# Patient Record
Sex: FEMALE | RURAL
[De-identification: names, ages, dates, MRNs, and addresses within clinical notes are randomized per-mention and may not be internally consistent; named-entity substitution may affect disease eponyms.]

---

## 2023-10-03 PROCEDURE — 88342 IMHCHEM/IMCYTCHM 1ST ANTB: CPT | Mod: 26,,, | Performed by: PATHOLOGY

## 2023-10-03 PROCEDURE — 88305 TISSUE EXAM BY PATHOLOGIST: CPT | Mod: TC,59,SUR

## 2023-10-03 PROCEDURE — 88305 TISSUE EXAM BY PATHOLOGIST: CPT | Mod: 26,,, | Performed by: PATHOLOGY

## 2023-10-03 PROCEDURE — 88342 PATHOLOGY, DERMATOLOGY: ICD-10-PCS | Mod: 26,,, | Performed by: PATHOLOGY

## 2023-10-03 PROCEDURE — 88341 PATHOLOGY, DERMATOLOGY: ICD-10-PCS | Mod: 26,,, | Performed by: PATHOLOGY

## 2023-10-03 PROCEDURE — 88305 PATHOLOGY, DERMATOLOGY: ICD-10-PCS | Mod: 26,,, | Performed by: PATHOLOGY

## 2023-10-03 PROCEDURE — 88341 IMHCHEM/IMCYTCHM EA ADD ANTB: CPT | Mod: 26,,, | Performed by: PATHOLOGY

## 2023-10-04 ENCOUNTER — LAB REQUISITION (OUTPATIENT)
Dept: LAB | Facility: HOSPITAL | Age: 68
End: 2023-10-04
Payer: MEDICARE

## 2023-10-04 DIAGNOSIS — D49.2 NEOPLASM OF UNSPECIFIED BEHAVIOR OF BONE, SOFT TISSUE, AND SKIN: ICD-10-CM

## 2023-10-05 LAB
ESTROGEN SERPL-MCNC: NORMAL PG/ML
INSULIN SERPL-ACNC: NORMAL U[IU]/ML
LAB AP GROSS DESCRIPTION: NORMAL
LAB AP LABORATORY NOTES: NORMAL
LAB AP SPEC A DDX: NORMAL
LAB AP SPEC A MORPHOLOGY: NORMAL
LAB AP SPEC A PROCEDURE: NORMAL
LAB AP SPEC B DDX: NORMAL
LAB AP SPEC B MORPHOLOGY: NORMAL
LAB AP SPEC B PROCEDURE: NORMAL
T3RU NFR SERPL: NORMAL %

## 2023-10-17 PROCEDURE — 88305 TISSUE EXAM BY PATHOLOGIST: CPT | Mod: 26,,, | Performed by: PATHOLOGY

## 2023-10-17 PROCEDURE — 88305 PATHOLOGY, DERMATOLOGY: ICD-10-PCS | Mod: 26,,, | Performed by: PATHOLOGY

## 2023-10-17 PROCEDURE — 88305 TISSUE EXAM BY PATHOLOGIST: CPT | Mod: TC,SUR | Performed by: DERMATOLOGY

## 2023-10-18 ENCOUNTER — LAB REQUISITION (OUTPATIENT)
Dept: LAB | Facility: HOSPITAL | Age: 68
End: 2023-10-18
Attending: DERMATOLOGY
Payer: MEDICARE

## 2023-10-18 DIAGNOSIS — D04.5 CARCINOMA IN SITU OF SKIN OF TRUNK: ICD-10-CM

## 2023-10-19 LAB
DHEA SERPL-MCNC: NORMAL
ESTROGEN SERPL-MCNC: NORMAL PG/ML
INSULIN SERPL-ACNC: NORMAL U[IU]/ML
LAB AP GROSS DESCRIPTION: NORMAL
LAB AP LABORATORY NOTES: NORMAL
LAB AP SPEC A DDX: NORMAL
LAB AP SPEC A MORPHOLOGY: NORMAL
LAB AP SPEC A PROCEDURE: NORMAL
T3RU NFR SERPL: NORMAL %

## 2023-10-23 PROCEDURE — 88305 TISSUE EXAM BY PATHOLOGIST: CPT | Mod: 26,,, | Performed by: PATHOLOGY

## 2023-10-23 PROCEDURE — 88305 TISSUE EXAM BY PATHOLOGIST: CPT | Mod: TC,SUR | Performed by: DERMATOLOGY

## 2023-10-23 PROCEDURE — 88305 PATHOLOGY, DERMATOLOGY: ICD-10-PCS | Mod: 26,,, | Performed by: PATHOLOGY

## 2023-10-24 ENCOUNTER — LAB REQUISITION (OUTPATIENT)
Dept: LAB | Facility: HOSPITAL | Age: 68
End: 2023-10-24
Attending: DERMATOLOGY
Payer: MEDICARE

## 2023-10-24 DIAGNOSIS — D22.5 MELANOCYTIC NEVI OF TRUNK: ICD-10-CM

## 2023-10-24 DIAGNOSIS — R20.8 OTHER DISTURBANCES OF SKIN SENSATION: ICD-10-CM

## 2023-10-24 DIAGNOSIS — L53.8 OTHER SPECIFIED ERYTHEMATOUS CONDITIONS: ICD-10-CM

## 2023-10-25 LAB
ESTROGEN SERPL-MCNC: NORMAL PG/ML
INSULIN SERPL-ACNC: NORMAL U[IU]/ML
LAB AP GROSS DESCRIPTION: NORMAL
LAB AP LABORATORY NOTES: NORMAL
LAB AP SPEC A DDX: NORMAL
LAB AP SPEC A MORPHOLOGY: NORMAL
LAB AP SPEC A PROCEDURE: NORMAL
T3RU NFR SERPL: NORMAL %

## 2025-03-20 PROCEDURE — 88305 TISSUE EXAM BY PATHOLOGIST: CPT | Mod: 26,,, | Performed by: PATHOLOGY

## 2025-03-20 PROCEDURE — 88341 IMHCHEM/IMCYTCHM EA ADD ANTB: CPT | Mod: 26,,, | Performed by: PATHOLOGY

## 2025-03-20 PROCEDURE — 88305 TISSUE EXAM BY PATHOLOGIST: CPT | Mod: TC,SUR | Performed by: DERMATOLOGY

## 2025-03-20 PROCEDURE — 88342 IMHCHEM/IMCYTCHM 1ST ANTB: CPT | Mod: 26,,, | Performed by: PATHOLOGY

## 2025-03-21 ENCOUNTER — LAB REQUISITION (OUTPATIENT)
Dept: LAB | Facility: HOSPITAL | Age: 70
End: 2025-03-21
Attending: DERMATOLOGY
Payer: MEDICARE

## 2025-03-21 DIAGNOSIS — D49.2 NEOPLASM OF UNSPECIFIED BEHAVIOR OF BONE, SOFT TISSUE, AND SKIN: ICD-10-CM

## 2025-03-25 LAB
DHEA SERPL-MCNC: NORMAL
ESTROGEN SERPL-MCNC: NORMAL PG/ML
INSULIN SERPL-ACNC: NORMAL U[IU]/ML
LAB AP GROSS DESCRIPTION: NORMAL
LAB AP LABORATORY NOTES: NORMAL
LAB AP SPEC A DDX: NORMAL
LAB AP SPEC A MORPHOLOGY: NORMAL
LAB AP SPEC B DDX: NORMAL
LAB AP SPEC B MORPHOLOGY: NORMAL
LAB AP SPEC B PROCEDURE: NORMAL
T3RU NFR SERPL: NORMAL %

## 2025-06-06 DIAGNOSIS — M54.2 NECK PAIN: Primary | ICD-10-CM

## 2025-06-06 DIAGNOSIS — M25.511 RIGHT SHOULDER PAIN: ICD-10-CM

## 2025-06-23 ENCOUNTER — CLINICAL SUPPORT (OUTPATIENT)
Dept: REHABILITATION | Facility: HOSPITAL | Age: 70
End: 2025-06-23
Payer: MEDICARE

## 2025-06-23 DIAGNOSIS — R53.1 WEAKNESS: ICD-10-CM

## 2025-06-23 DIAGNOSIS — M25.511 CHRONIC RIGHT SHOULDER PAIN: Primary | ICD-10-CM

## 2025-06-23 DIAGNOSIS — G89.29 CHRONIC RIGHT SHOULDER PAIN: Primary | ICD-10-CM

## 2025-06-23 DIAGNOSIS — M54.2 CERVICALGIA: ICD-10-CM

## 2025-06-23 PROCEDURE — 97140 MANUAL THERAPY 1/> REGIONS: CPT

## 2025-06-23 PROCEDURE — 97161 PT EVAL LOW COMPLEX 20 MIN: CPT

## 2025-06-23 NOTE — PATIENT INSTRUCTIONS
Access Code: [email or web address]  URL: [email or web address]  Date: 06/23/2025  Prepared by: Jacky Marie    Exercises  - Corner Pec Major Stretch  - 3 reps - 30 sec hold  - Seated Scapular Retraction  - 3 sets - 10 reps  - Supine Chin Tuck  - 3 sets - 10 reps

## 2025-06-23 NOTE — PROGRESS NOTES
"  Outpatient Rehab    Physical Therapy Evaluation    Patient Name: Chen Millan  MRN: [id number]  YOB: 1955  Encounter Date: 6/23/2025    Therapy Diagnosis:   Encounter Diagnoses   Name Primary?    Chronic right shoulder pain Yes    Cervicalgia     Weakness      Physician: Haylie Rivas DO    Physician Orders: Eval and Treat  Medical Diagnosis: Right shoulder pain  Surgical Diagnosis: Not applicable for this Episode   Surgical Date: Not applicable for this Episode  Days Since Last Surgery: Not applicable for this Episode    Visit # / Visits Authorized:  1 / 1  Insurance Authorization Period: 6/6/2025 to 6/6/2026  Date of Evaluation: 6/23/2025  Plan of Care Certification: 6/23/2025 to 07/18/2025     Time In: 1405   Time Out: 1450  Total Time (in minutes): 45   Total Billable Time (in minutes): 45    Intake Outcome Measure for FOTO Survey    Therapist reviewed FOTO scores for Chen Millan on 6/23/2025.   FOTO report - see Media section or FOTO account episode details.     Intake Score: 56%    Precautions:       Subjective   History of Present Illness  Chen is a 69 y.o. female who reports to physical therapy with a chief concern of headaches.         Diagnostic tests related to this condition: X-ray.   X-Ray Details: FINDINGS:There is straightening of the normal lordotic curve of the cervical spine.  The vertebral body heights are well maintained.  There is moderately prominent degenerative disc disease at C5-C6 and C6-C7 with moderate facet joint arthropathy posteriorly throughout the mid cervical spine.    History of Present Condition/Illness: Pt reports that several months ago she was stepping down off a ladder and did not know she was still as high as she was and stepped down from higher rung than she thought she was on, "jamming" her knee. She says that she was unable to bear a lot of weight on the R knee for 3 days as a result of the ladder incident. She says that her knee is better now but " that what is still present is what feels like a muscle knot on the R side of her upper back near her R shoulder blade. She says that she will have pain that radiates upward toward head that will cause headaches, worse on the R side compared to the L side. She says that she did try a chiropractor but that what they did with her did not really address her main concern of headaches. Pt denies numbness/tingling into the UE's. She denies headaches/neck/shoulder pain prior to this incident. Pt reports that she underwent/finished breast cancer treatment in 2022 with most recent scan in September 2024 with reports that everything was clear at that time.     Pain     Patient reports a current pain level of 3/10. Pain at best is reported as 2/10. Pain at worst is reported as 7/10.   Location: head/neck (headaches)  Clinical Progression (since onset): Stable  Pain Qualities: Aching, Radiating, Other (Comment)  Other Pain Qualities: pressure  Pain-Relieving Factors: Other (Comment), Heat, Ice  Other Pain-Relieving Factors: unable to report if anything makes the headache better  Pain-Aggravating Factors: Other (Comment)  Other Pain-Aggravating Factors: unable to report if anything makes the headache worse           Past Medical History/Physical Systems Review:   Chen Millan  has no past medical history on file.    Chen Millan  has no past surgical history on file.    Chen currently has no medications in their medication list.    Review of patient's allergies indicates:  Not on File     Objective   Posture  Patient presents with a Forward head position.     Shoulders are Elevated and Rounded.             Spinal Mobility  Cervical Mobility Details: Generally hypomobile throughout C-spine with CPA's and bilateral side glides     Spinal Muscle Palpation     Ttp noted at about T5-T6 area on the R side and in supraspinatus on the R side.                Subcranial Range of Motion   Active Restricted? Passive Restricted? Pain    Flexion         Protraction         Retraction           Cervical Range of Motion   Active (deg) Passive (deg) Pain   Flexion 56 (no change in pain, just some pulling across CT junction)       Extension 44 (no change in pain, just some discomfort near CT junction)       Right Lateral Flexion 22 (no change in headache symptoms)       Right Rotation 58 (no change in headache symptoms)       Left Lateral Flexion 20 (no change in headache symptoms)       Left Rotation 55 (no change in headache symptoms)         Overpressure does not change pt's headache symptoms.               Cervical Screen  Tests  Positive: Right Distraction and Left Distraction  Negative: Right Spurling's B and Left Spurling's B  Cervical Range of Motion           Thoracic Range of Motion             Cervical/Thoracic Special Tests            Cervical Tests  Positive: Right Distraction and Left Distraction  Negative: Right Cervical Compression and Left Cervical Compression  Negative: Right Spurling's B and Left Spurling's B  Distraction (+) for slight relief of symptoms.               Treatment:  Therapeutic Exercise  TE 1: pt education: POC and HEP  TE 2: chin tucks  TE 3: corner stretch  TE 4: scap retract  Manual Therapy  MT 1: manual cervical traction in supine      Time Entry(in minutes):  PT Evaluation (Low) Time Entry: 33  Manual Therapy Time Entry: 8  Therapeutic Exercise Time Entry: 4    Assessment & Plan   Assessment  Chen presents with a condition of Low complexity.   Presentation of Symptoms: Stable  Will Comorbidities Impact Care: No       Functional Limitations: Participating in leisure activities, Pain with ADLs/IADLs  Personal Factors Affecting Prognosis: Pain    Patient Goal for Therapy (PT): decrease severity and frequency of headaches  Prognosis: Good  Assessment Details: Pt with cervicogenic headache. She has decreased C-spine mobility. She will likely benefit from postural re-education and cervical mobility/flexibility and  strengthening.     Plan  From a physical therapy perspective, the patient would benefit from: Skilled Rehab Services    Planned therapy interventions include: Therapeutic exercise, Therapeutic activities, Neuromuscular re-education, and Manual therapy.    Planned modalities to include: Cryotherapy (cold pack) and Thermotherapy (hot pack).        Visit Frequency: 2 times Per Week for 4 Weeks.       This plan was discussed with Patient.   Discussion participants: Agreed Upon Plan of Care  Plan details: Pt reports finishing breast cancer treatments in 2022, so will likely need to hold on modalities unless cleared by oncologist.           The patient's spiritual, cultural, and educational needs were considered, and the patient is agreeable to the plan of care and goals.           Goals:   Active       Long term goals       pt will be independent with progressive HEP       Start:  06/23/25    Expected End:  07/18/25            pt will increase FOTO score to greater than or equal to 66 by DC in order to demonstrate an increase in functional ability       Start:  06/23/25    Expected End:  07/18/25            pt will decrease headache severity at worst from 9/10 to less than or equal to 3/10 to improve QOL       Start:  06/23/25    Expected End:  07/18/25            pt will decrease frequency of headache from everyday to only 3 days a week in order to improve QOL       Start:  06/23/25    Expected End:  07/18/25            pt will increase independence with postural awareness as evidenced by requiring no cuing during PT sessions       Start:  06/23/25    Expected End:  07/18/25               Short term goals       pt will decrease headache severity at worst from 9/10 to less than or equal to 6/10 to improve QOL       Start:  06/23/25    Expected End:  07/04/25            pt will decrease frequency of headache from everyday to only 4 days a week in order to improve QOL       Start:  06/23/25    Expected End:  07/04/25             pt will increase independence with postural awareness as evidenced by requiring only minimal cuing during PT sessions       Start:  06/23/25    Expected End:  07/04/25                Jacky Marie PT, DPT  6/23/2025

## 2025-06-23 NOTE — LETTER
June 23, 2025  Haylie Rivas DO  [address] [facility] [place] [address]      To whom it may concern,     The attached plan of care is being sent to you for review and reference.    You may indicate your approval by signing the document electronically, or by faxing/mailing a signed copy of the final page of this document back to the attention of Jacky Marie PT, DPT:         Plan of Care 6/23/25   Effective from: 6/23/2025  Effective to: 7/18/2025    Plan ID: [id number]            Participants as of Finalize on 6/23/2025    Name Type Comments Contact Info    Haylie Rivas DO Referring Provider  [phone]    Jacky Marie PT, DPT Physical Therapist         Last Plan Note     Author: Jacky Marie PT, DPT Status: Signed Last edited: 6/23/2025  2:00 PM         Outpatient Rehab    Physical Therapy Evaluation    Patient Name: Chen Millan  MRN: [id number]  YOB: 1955  Encounter Date: 6/23/2025    Therapy Diagnosis:   Encounter Diagnoses   Name Primary?    Chronic right shoulder pain Yes    Cervicalgia     Weakness      Physician: Haylie Rivas DO    Physician Orders: Eval and Treat  Medical Diagnosis: Right shoulder pain  Surgical Diagnosis: Not applicable for this Episode   Surgical Date: Not applicable for this Episode  Days Since Last Surgery: Not applicable for this Episode    Visit # / Visits Authorized:  1 / 1  Insurance Authorization Period: 6/6/2025 to 6/6/2026  Date of Evaluation: 6/23/2025  Plan of Care Certification: 6/23/2025 to 07/18/2025     Time In: 1405   Time Out: 1450  Total Time (in minutes): 45   Total Billable Time (in minutes): 45    Intake Outcome Measure for FOTO Survey    Therapist reviewed FOTO scores for Chen Millan on 6/23/2025.   FOTO report - see Media section or FOTO account episode details.     Intake Score: 56%    Precautions:       Subjective   History of Present Illness  Chen is a 69 y.o. female who reports to physical  "therapy with a chief concern of headaches.         Diagnostic tests related to this condition: X-ray.   X-Ray Details: FINDINGS:There is straightening of the normal lordotic curve of the cervical spine.  The vertebral body heights are well maintained.  There is moderately prominent degenerative disc disease at C5-C6 and C6-C7 with moderate facet joint arthropathy posteriorly throughout the mid cervical spine.    History of Present Condition/Illness: Pt reports that several months ago she was stepping down off a ladder and did not know she was still as high as she was and stepped down from higher rung than she thought she was on, "jamming" her knee. She says that she was unable to bear a lot of weight on the R knee for 3 days as a result of the ladder incident. She says that her knee is better now but that what is still present is what feels like a muscle knot on the R side of her upper back near her R shoulder blade. She says that she will have pain that radiates upward toward head that will cause headaches, worse on the R side compared to the L side. She says that she did try a chiropractor but that what they did with her did not really address her main concern of headaches. Pt denies numbness/tingling into the UE's. She denies headaches/neck/shoulder pain prior to this incident. Pt reports that she underwent/finished breast cancer treatment in 2022 with most recent scan in September 2024 with reports that everything was clear at that time.     Pain     Patient reports a current pain level of 3/10. Pain at best is reported as 2/10. Pain at worst is reported as 7/10.   Location: head/neck (headaches)  Clinical Progression (since onset): Stable  Pain Qualities: Aching, Radiating, Other (Comment)  Other Pain Qualities: pressure  Pain-Relieving Factors: Other (Comment), Heat, Ice  Other Pain-Relieving Factors: unable to report if anything makes the headache better  Pain-Aggravating Factors: Other (Comment)  Other " Pain-Aggravating Factors: unable to report if anything makes the headache worse           Past Medical History/Physical Systems Review:   Chen Millan  has no past medical history on file.    Chen Millan  has no past surgical history on file.    Chen currently has no medications in their medication list.    Review of patient's allergies indicates:  Not on File     Objective   Posture  Patient presents with a Forward head position.     Shoulders are Elevated and Rounded.             Spinal Mobility  Cervical Mobility Details: Generally hypomobile throughout C-spine with CPA's and bilateral side glides     Spinal Muscle Palpation     Ttp noted at about T5-T6 area on the R side and in supraspinatus on the R side.                Subcranial Range of Motion   Active Restricted? Passive Restricted? Pain   Flexion         Protraction         Retraction           Cervical Range of Motion   Active (deg) Passive (deg) Pain   Flexion 56 (no change in pain, just some pulling across CT junction)       Extension 44 (no change in pain, just some discomfort near CT junction)       Right Lateral Flexion 22 (no change in headache symptoms)       Right Rotation 58 (no change in headache symptoms)       Left Lateral Flexion 20 (no change in headache symptoms)       Left Rotation 55 (no change in headache symptoms)         Overpressure does not change pt's headache symptoms.               Cervical Screen  Tests  Positive: Right Distraction and Left Distraction  Negative: Right Spurling's B and Left Spurling's B  Cervical Range of Motion           Thoracic Range of Motion             Cervical/Thoracic Special Tests            Cervical Tests  Positive: Right Distraction and Left Distraction  Negative: Right Cervical Compression and Left Cervical Compression  Negative: Right Spurling's B and Left Spurling's B  Distraction (+) for slight relief of symptoms.               Treatment:  Therapeutic Exercise  TE 1: pt education: POC and HEP  TE  2: chin tucks  TE 3: corner stretch  TE 4: scap retract  Manual Therapy  MT 1: manual cervical traction in supine      Time Entry(in minutes):  PT Evaluation (Low) Time Entry: 33  Manual Therapy Time Entry: 8  Therapeutic Exercise Time Entry: 4    Assessment & Plan   Assessment  Chen presents with a condition of Low complexity.   Presentation of Symptoms: Stable  Will Comorbidities Impact Care: No       Functional Limitations: Participating in leisure activities, Pain with ADLs/IADLs  Personal Factors Affecting Prognosis: Pain    Patient Goal for Therapy (PT): decrease severity and frequency of headaches  Prognosis: Good  Assessment Details: Pt with cervicogenic headache. She has decreased C-spine mobility. She will likely benefit from postural re-education and cervical mobility/flexibility and strengthening.     Plan  From a physical therapy perspective, the patient would benefit from: Skilled Rehab Services    Planned therapy interventions include: Therapeutic exercise, Therapeutic activities, Neuromuscular re-education, and Manual therapy.    Planned modalities to include: Cryotherapy (cold pack) and Thermotherapy (hot pack).        Visit Frequency: 2 times Per Week for 4 Weeks.       This plan was discussed with Patient.   Discussion participants: Agreed Upon Plan of Care  Plan details: Pt reports finishing breast cancer treatments in 2022, so will likely need to hold on modalities unless cleared by oncologist.           The patient's spiritual, cultural, and educational needs were considered, and the patient is agreeable to the plan of care and goals.           Goals:   Active       Long term goals       pt will be independent with progressive HEP       Start:  06/23/25    Expected End:  07/18/25            pt will increase FOTO score to greater than or equal to 66 by DC in order to demonstrate an increase in functional ability       Start:  06/23/25    Expected End:  07/18/25            pt will decrease  headache severity at worst from 9/10 to less than or equal to 3/10 to improve QOL       Start:  06/23/25    Expected End:  07/18/25            pt will decrease frequency of headache from everyday to only 3 days a week in order to improve QOL       Start:  06/23/25    Expected End:  07/18/25            pt will increase independence with postural awareness as evidenced by requiring no cuing during PT sessions       Start:  06/23/25    Expected End:  07/18/25               Short term goals       pt will decrease headache severity at worst from 9/10 to less than or equal to 6/10 to improve QOL       Start:  06/23/25    Expected End:  07/04/25            pt will decrease frequency of headache from everyday to only 4 days a week in order to improve QOL       Start:  06/23/25    Expected End:  07/04/25            pt will increase independence with postural awareness as evidenced by requiring only minimal cuing during PT sessions       Start:  06/23/25    Expected End:  07/04/25                Jacky Marie PT, DPT  6/23/2025          Current Participants as of 6/23/2025    Name Type Comments Contact Info    Haylie Rivas DO Referring Provider  [phone]    Signature pending    Jacky Marie PT, DPT Physical Therapist      Electronically signed by Jacyk Marie PT, DPT at 6/23/2025 1515 CDT            Sincerely,      Jacky Marie PT, DPT  [facility]                                                            Dear Jacky Marie PT, DPT,    RE: Ms. Chen Millan, MRN: [id number]    I certify that I have reviewed the attached plan of care and agree to the details within.        ___________________________  ___________________________  Provider Printed Name   Provider Signed Name      ___________________________  Date and Time

## 2025-06-26 ENCOUNTER — CLINICAL SUPPORT (OUTPATIENT)
Dept: REHABILITATION | Facility: HOSPITAL | Age: 70
End: 2025-06-26
Payer: MEDICARE

## 2025-06-26 DIAGNOSIS — M54.2 CERVICALGIA: ICD-10-CM

## 2025-06-26 DIAGNOSIS — M25.511 CHRONIC RIGHT SHOULDER PAIN: Primary | ICD-10-CM

## 2025-06-26 DIAGNOSIS — G89.29 CHRONIC RIGHT SHOULDER PAIN: Primary | ICD-10-CM

## 2025-06-26 DIAGNOSIS — R53.1 WEAKNESS: ICD-10-CM

## 2025-06-26 PROCEDURE — 97112 NEUROMUSCULAR REEDUCATION: CPT | Mod: CQ

## 2025-06-26 PROCEDURE — 97110 THERAPEUTIC EXERCISES: CPT | Mod: CQ

## 2025-06-26 NOTE — PROGRESS NOTES
Outpatient Rehab    Physical Therapy Visit    Patient Name: Chen Millan  MRN: [id number]  YOB: 1955  Encounter Date: 6/26/2025    Therapy Diagnosis:   Encounter Diagnoses   Name Primary?    Chronic right shoulder pain Yes    Cervicalgia     Weakness      Physician: Haylie Rivas DO    Physician Orders: Eval and Treat  Medical Diagnosis: Right shoulder pain  Surgical Diagnosis: Not applicable for this Episode   Surgical Date: Not applicable for this Episode  Days Since Last Surgery: Not applicable for this Episode    Visit # / Visits Authorized:  1 / 8  Insurance Authorization Period: 6/23/2025 to 6/7/2027  Date of Evaluation: 6/23/2025  Plan of Care Certification: 6/23/2025 to 7/18/2025      PT/PTA: PTA   Number of PTA visits since last PT visit:1  Time In: 1406   Time Out: 1445  Total Time (in minutes): 39   Total Billable Time (in minutes): 39    FOTO:  Intake Score: 56%  Survey Score 2:  %  Survey Score 3:  %    Precautions:       Subjective   Patient voiced she is having no pain just a slight headache at the moment..  Pain reported as 0/10.      Objective            Treatment:  Therapeutic Exercise  TE 1: UBE retro x 4 min  TE 2: Corner stretch 3 x 20 s/h  TE 3: Upper trap stretch 3 x 20 s/h  TE 4: Levator scap stretch 3 x 20 s/h  TE 5: Cervical flex and extension 10 x 5 s/h each way  TE 6: Cervial rotations with light pressure 10 x 5 s/h each way  Balance/Neuromuscular Re-Education  NMR 1: seated scap retractions 3 x 20 s/h  NMR 2: khai shoulder extension RTB 20x  NMR 3: khai shoulder rows/retraction RTB 20x  NMR 4: horizontal abd RTB 20x    Time Entry(in minutes):  Neuromuscular Re-Education Time Entry: 12  Therapeutic Exercise Time Entry: 27    Assessment & Plan   Assessment: Received POC from Jacky Marie, PT, DPT. Today was patient's first tx since eval. She reports compliance with HEP. Focused on cervical ROM, strengthening, shoulder ROM, strengthening and postural control. She demo good  effort today without complaints. She denied the need for modalities. Will continue to progress as needed.  Evaluation/Treatment Tolerance: Patient tolerated treatment well    The patient will continue to benefit from skilled outpatient physical therapy in order to address the deficits listed in the problem list on the initial evaluation, provide patient and family education, and maximize the patients level of independence in the home and community environments.     The patient's spiritual, cultural, and educational needs were considered, and the patient is agreeable to the plan of care and goals.           Plan: continue with current POC.    Goals:   Active       Long term goals       pt will be independent with progressive HEP (Progressing)       Start:  06/23/25    Expected End:  07/18/25            pt will increase FOTO score to greater than or equal to 66 by DC in order to demonstrate an increase in functional ability (Progressing)       Start:  06/23/25    Expected End:  07/18/25            pt will decrease headache severity at worst from 9/10 to less than or equal to 3/10 to improve QOL (Progressing)       Start:  06/23/25    Expected End:  07/18/25            pt will decrease frequency of headache from everyday to only 3 days a week in order to improve QOL (Progressing)       Start:  06/23/25    Expected End:  07/18/25            pt will increase independence with postural awareness as evidenced by requiring no cuing during PT sessions (Progressing)       Start:  06/23/25    Expected End:  07/18/25               Short term goals       pt will decrease headache severity at worst from 9/10 to less than or equal to 6/10 to improve QOL (Progressing)       Start:  06/23/25    Expected End:  07/04/25            pt will decrease frequency of headache from everyday to only 4 days a week in order to improve QOL (Progressing)       Start:  06/23/25    Expected End:  07/04/25            pt will increase independence  with postural awareness as evidenced by requiring only minimal cuing during PT sessions (Progressing)       Start:  06/23/25    Expected End:  07/04/25                Laine Martini, PTA

## 2025-06-30 ENCOUNTER — CLINICAL SUPPORT (OUTPATIENT)
Dept: REHABILITATION | Facility: HOSPITAL | Age: 70
End: 2025-06-30
Payer: MEDICARE

## 2025-06-30 DIAGNOSIS — R53.1 WEAKNESS: ICD-10-CM

## 2025-06-30 DIAGNOSIS — M25.511 CHRONIC RIGHT SHOULDER PAIN: Primary | ICD-10-CM

## 2025-06-30 DIAGNOSIS — G89.29 CHRONIC RIGHT SHOULDER PAIN: Primary | ICD-10-CM

## 2025-06-30 DIAGNOSIS — M54.2 CERVICALGIA: ICD-10-CM

## 2025-06-30 PROCEDURE — 97112 NEUROMUSCULAR REEDUCATION: CPT | Mod: CQ

## 2025-06-30 PROCEDURE — 97110 THERAPEUTIC EXERCISES: CPT | Mod: CQ

## 2025-06-30 NOTE — PROGRESS NOTES
Outpatient Rehab    Physical Therapy Visit    Patient Name: Chen Millan  MRN: [id number]  YOB: 1955  Encounter Date: 6/30/2025    Therapy Diagnosis:   Encounter Diagnoses   Name Primary?    Chronic right shoulder pain Yes    Cervicalgia     Weakness      Physician: Haylie Rivas DO    Physician Orders: Eval and Treat  Medical Diagnosis: Right shoulder pain  Surgical Diagnosis: Not applicable for this Episode   Surgical Date: Not applicable for this Episode  Days Since Last Surgery: Not applicable for this Episode    Visit # / Visits Authorized:  2 / 8  Insurance Authorization Period: 6/23/2025 to 7/18/2025  Date of Evaluation: 6/23/2025  Plan of Care Certification: 6/23/2025 to 7/18/2025      PT/PTA: PTA   Number of PTA visits since last PT visit:2  Time In: 1344   Time Out: 1422  Total Time (in minutes): 38   Total Billable Time (in minutes): 38    FOTO:  Intake Score: 56%  Survey Score 2:  %  Survey Score 3:  %    Precautions:       Subjective   patient voiced that she is not hurting any today.  Pain reported as 0/10.      Objective            Treatment:  Therapeutic Exercise  TE 1: UBE retro x 5 min  TE 2: Corner stretch 3 x 20 s/h  TE 3: Upper trap stretch 3 x 20 s/h  TE 4: Levator scap stretch 3 x 20 s/h  TE 5: Cervical flex and extension 10 x 5 s/h each way  TE 6: Cervial rotations with light pressure 10 x 5 s/h each way  Balance/Neuromuscular Re-Education  NMR 1: seated scap retractions 3 x 20 s/h  NMR 2: khai shoulder extension RTB 20x  NMR 3: khai shoulder rows/retraction RTB 20x  NMR 4: horizontal abd RTB 20x  NMR 5: pulleys x 5 min  NMR 6: scaption with 1 lb 2 x 10  NMR 7: IR with RTB on right x 10  NMR 8: ER with RTB on right x 10  NMR 9: shoulder flexion with RTB on right x 10  NMR 10: towel slides up wall x 10    Time Entry(in minutes):  Neuromuscular Re-Education Time Entry: 15  Therapeutic Exercise Time Entry: 23    Assessment & Plan   Assessment: Patient was able tolerate  additional exercises with no pain.        The patient will continue to benefit from skilled outpatient physical therapy in order to address the deficits listed in the problem list on the initial evaluation, provide patient and family education, and maximize the patients level of independence in the home and community environments.     The patient's spiritual, cultural, and educational needs were considered, and the patient is agreeable to the plan of care and goals.           Plan: continue with current POC.    Goals:   Active       Long term goals       pt will be independent with progressive HEP (Progressing)       Start:  06/23/25    Expected End:  07/18/25            pt will increase FOTO score to greater than or equal to 66 by DC in order to demonstrate an increase in functional ability (Progressing)       Start:  06/23/25    Expected End:  07/18/25            pt will decrease headache severity at worst from 9/10 to less than or equal to 3/10 to improve QOL (Progressing)       Start:  06/23/25    Expected End:  07/18/25            pt will decrease frequency of headache from everyday to only 3 days a week in order to improve QOL (Progressing)       Start:  06/23/25    Expected End:  07/18/25            pt will increase independence with postural awareness as evidenced by requiring no cuing during PT sessions (Progressing)       Start:  06/23/25    Expected End:  07/18/25               Short term goals       pt will decrease headache severity at worst from 9/10 to less than or equal to 6/10 to improve QOL (Progressing)       Start:  06/23/25    Expected End:  07/04/25            pt will decrease frequency of headache from everyday to only 4 days a week in order to improve QOL (Progressing)       Start:  06/23/25    Expected End:  07/04/25            pt will increase independence with postural awareness as evidenced by requiring only minimal cuing during PT sessions (Progressing)       Start:  06/23/25     Expected End:  07/04/25                Fernanda London, PTA

## 2025-07-03 ENCOUNTER — CLINICAL SUPPORT (OUTPATIENT)
Dept: REHABILITATION | Facility: HOSPITAL | Age: 70
End: 2025-07-03
Payer: MEDICARE

## 2025-07-03 DIAGNOSIS — M25.511 CHRONIC RIGHT SHOULDER PAIN: Primary | ICD-10-CM

## 2025-07-03 DIAGNOSIS — M54.2 CERVICALGIA: ICD-10-CM

## 2025-07-03 DIAGNOSIS — G89.29 CHRONIC RIGHT SHOULDER PAIN: Primary | ICD-10-CM

## 2025-07-03 DIAGNOSIS — R53.1 WEAKNESS: ICD-10-CM

## 2025-07-03 PROCEDURE — 97110 THERAPEUTIC EXERCISES: CPT | Mod: CQ

## 2025-07-03 PROCEDURE — 97112 NEUROMUSCULAR REEDUCATION: CPT | Mod: CQ

## 2025-07-03 NOTE — PROGRESS NOTES
Outpatient Rehab    Physical Therapy Visit    Patient Name: Chen Millan  MRN: [id number]  YOB: 1955  Encounter Date: 7/3/2025    Therapy Diagnosis:   Encounter Diagnoses   Name Primary?    Chronic right shoulder pain Yes    Cervicalgia     Weakness      Physician: Haylie Rivas DO    Physician Orders: Eval and Treat  Medical Diagnosis: Right shoulder pain  Surgical Diagnosis: Not applicable for this Episode   Surgical Date: Not applicable for this Episode  Days Since Last Surgery: Not applicable for this Episode    Visit # / Visits Authorized:  3 / 8  Insurance Authorization Period: 6/23/2025 to 7/18/2025  Date of Evaluation: 6/23/2025  Plan of Care Certification: 6/23/2025 to 7/18/2025      PT/PTA: PTA   Number of PTA visits since last PT visit:3  Time In: 1440   Time Out: 1515  Total Time (in minutes): 35   Total Billable Time (in minutes): 35    FOTO:  Intake Score: 56%  Survey Score 2:  %  Survey Score 3:  %    Precautions:       Subjective   patient voiced she is feeling good, no pain on arrival..  Pain reported as 0/10. head/right shoulder    Objective            Treatment:  Therapeutic Exercise  TE 1: UBE retro x 2.5--- modified today  TE 2: Corner stretch 3 x 30 s/h  TE 3: Upper trap stretch 3 x 20 s/h  TE 4: Levator scap stretch 3 x 20 s/h  TE 5: Cervical flex and extension 10 x 5 s/h each way  TE 6: Cervial rotations with light pressure 10 x 5 s/h each way  Balance/Neuromuscular Re-Education  NMR 2: khai shoulder extension RTB 20x  NMR 3: khai shoulder rows/retraction RTB 20x  NMR 4: horizontal abd RTB 20x  NMR 5: pulleys x 3 min--modified today  NMR 6: scaption with RTB  x 10  NMR 7: IR with RTB on right x 10  NMR 8: ER with RTB on right x 10  NMR 9: shoulder flexion with RTB on right x 10  NMR 10: towel slides up wall x 10    Time Entry(in minutes):  Neuromuscular Re-Education Time Entry: 15  Therapeutic Exercise Time Entry: 20    Assessment & Plan   Assessment: Patient arrived with  no pain. She voiced she would like today's tx to be short due to preparation for upcoming holiday tomorrow. She performed all interventions fairly well without difficulty or pain observed.   Evaluation/Treatment Tolerance: Patient tolerated treatment well    The patient will continue to benefit from skilled outpatient physical therapy in order to address the deficits listed in the problem list on the initial evaluation, provide patient and family education, and maximize the patients level of independence in the home and community environments.     The patient's spiritual, cultural, and educational needs were considered, and the patient is agreeable to the plan of care and goals.           Plan: continue with current POC.    Goals:   Active       Long term goals       pt will be independent with progressive HEP (Progressing)       Start:  06/23/25    Expected End:  07/18/25            pt will increase FOTO score to greater than or equal to 66 by DC in order to demonstrate an increase in functional ability (Progressing)       Start:  06/23/25    Expected End:  07/18/25            pt will decrease headache severity at worst from 9/10 to less than or equal to 3/10 to improve QOL (Progressing)       Start:  06/23/25    Expected End:  07/18/25            pt will decrease frequency of headache from everyday to only 3 days a week in order to improve QOL (Progressing)       Start:  06/23/25    Expected End:  07/18/25            pt will increase independence with postural awareness as evidenced by requiring no cuing during PT sessions (Progressing)       Start:  06/23/25    Expected End:  07/18/25               Short term goals       pt will decrease headache severity at worst from 9/10 to less than or equal to 6/10 to improve QOL (Progressing)       Start:  06/23/25    Expected End:  07/04/25            pt will decrease frequency of headache from everyday to only 4 days a week in order to improve QOL (Progressing)        Start:  06/23/25    Expected End:  07/04/25            pt will increase independence with postural awareness as evidenced by requiring only minimal cuing during PT sessions (Progressing)       Start:  06/23/25    Expected End:  07/04/25                Laine Martini, PTA

## 2025-07-07 ENCOUNTER — CLINICAL SUPPORT (OUTPATIENT)
Dept: REHABILITATION | Facility: HOSPITAL | Age: 70
End: 2025-07-07
Payer: MEDICARE

## 2025-07-07 DIAGNOSIS — G89.29 CHRONIC RIGHT SHOULDER PAIN: Primary | ICD-10-CM

## 2025-07-07 DIAGNOSIS — M54.2 CERVICALGIA: ICD-10-CM

## 2025-07-07 DIAGNOSIS — R53.1 WEAKNESS: ICD-10-CM

## 2025-07-07 DIAGNOSIS — M25.511 CHRONIC RIGHT SHOULDER PAIN: Primary | ICD-10-CM

## 2025-07-07 PROCEDURE — 97110 THERAPEUTIC EXERCISES: CPT | Mod: CQ

## 2025-07-07 PROCEDURE — 97112 NEUROMUSCULAR REEDUCATION: CPT | Mod: CQ

## 2025-07-07 NOTE — PROGRESS NOTES
Outpatient Rehab    Physical Therapy Visit    Patient Name: Chen Millan  MRN: [id number]  YOB: 1955  Encounter Date: 7/7/2025    Therapy Diagnosis:   Encounter Diagnoses   Name Primary?    Chronic right shoulder pain Yes    Cervicalgia     Weakness      Physician: Haylie Rivas DO    Physician Orders: Eval and Treat  Medical Diagnosis: Right shoulder pain  Surgical Diagnosis: Not applicable for this Episode   Surgical Date: Not applicable for this Episode  Days Since Last Surgery: Not applicable for this Episode    Visit # / Visits Authorized:  4 / 8  Insurance Authorization Period: 6/23/2025 to 7/18/2025  Date of Evaluation: 6/23/2025  Plan of Care Certification: 6/23/2025 to 7/18/2025      PT/PTA: PTA   Number of PTA visits since last PT visit:4  Time In: 1401   Time Out: 1439  Total Time (in minutes): 38   Total Billable Time (in minutes): 38    FOTO:  Intake Score: 56%  Survey Score 2: 67%  Survey Score 3:  %    Precautions:       Subjective   patient voiced her headaches have decreased since evaluation..  Pain reported as 0/10. head/right shoulder    Objective            Treatment:  Therapeutic Exercise  TE 1: UBE retro x 5 min  TE 2: Corner stretch 3 x 30 s/h  TE 3: Upper trap stretch 3 x 20 s/h  TE 4: Levator scap stretch 3 x 20 s/h  TE 5: Cervical flex and extension 10 x 5 s/h each way  TE 6: Cervial rotations with light pressure 10 x 5 s/h each way  Balance/Neuromuscular Re-Education  NMR 2: brooks shoulder extension GTB 20x  NMR 3: brooks shoulder rows/retraction GTB 20x  NMR 4: horizontal abd RTB 20x  NMR 5: pulleys x 3 min  NMR 6: R scaption with RTB  x 10  NMR 7: Brooks IR with RTB on right x 10  NMR 8: Brooks ER with RTB on right x 10  NMR 9: shoulder flexion with RTB on right x 10  NMR 10: towel slides up wall 10 x 10 s/h  Therapeutic Activity  TA 1: waist to shelf 2# 15x    Time Entry(in minutes):  Neuromuscular Re-Education Time Entry: 15  Therapeutic Activity Time Entry: 3  Therapeutic  Exercise Time Entry: 20    Assessment & Plan   Assessment: Patient arrived with no pain. She was able to tolerate increase in TB resistance with shoulder retractions and extensions without difficulty. Added waist to shelf activity using 2# weight for overhead right upper extremity motion without pain voiced. She reports her amount of headaches has decreased to 3 x a week since coming to PT. Her Midpoint FOTO score was 67/100. Will continue to progress as able.  Evaluation/Treatment Tolerance: Patient tolerated treatment well    The patient will continue to benefit from skilled outpatient physical therapy in order to address the deficits listed in the problem list on the initial evaluation, provide patient and family education, and maximize the patients level of independence in the home and community environments.     The patient's spiritual, cultural, and educational needs were considered, and the patient is agreeable to the plan of care and goals.           Plan: continue with current POC.    Goals:   Active       Long term goals       pt will be independent with progressive HEP (Progressing)       Start:  06/23/25    Expected End:  07/18/25            pt will increase FOTO score to greater than or equal to 66 by DC in order to demonstrate an increase in functional ability (Progressing)       Start:  06/23/25    Expected End:  07/18/25            pt will decrease headache severity at worst from 9/10 to less than or equal to 3/10 to improve QOL (Progressing)       Start:  06/23/25    Expected End:  07/18/25            pt will decrease frequency of headache from everyday to only 3 days a week in order to improve QOL (Met)       Start:  06/23/25    Expected End:  07/18/25    Resolved:  07/07/25         pt will increase independence with postural awareness as evidenced by requiring no cuing during PT sessions (Progressing)       Start:  06/23/25    Expected End:  07/18/25               Short term goals       pt will  decrease headache severity at worst from 9/10 to less than or equal to 6/10 to improve QOL (Progressing)       Start:  06/23/25    Expected End:  07/04/25            pt will decrease frequency of headache from everyday to only 4 days a week in order to improve QOL (Met)       Start:  06/23/25    Expected End:  07/04/25    Resolved:  07/07/25         pt will increase independence with postural awareness as evidenced by requiring only minimal cuing during PT sessions (Progressing)       Start:  06/23/25    Expected End:  07/04/25                Laine Martini, PTA

## 2025-07-10 ENCOUNTER — CLINICAL SUPPORT (OUTPATIENT)
Dept: REHABILITATION | Facility: HOSPITAL | Age: 70
End: 2025-07-10
Payer: MEDICARE

## 2025-07-10 DIAGNOSIS — G89.29 CHRONIC RIGHT SHOULDER PAIN: Primary | ICD-10-CM

## 2025-07-10 DIAGNOSIS — M25.511 CHRONIC RIGHT SHOULDER PAIN: Primary | ICD-10-CM

## 2025-07-10 DIAGNOSIS — R53.1 WEAKNESS: ICD-10-CM

## 2025-07-10 DIAGNOSIS — M54.2 CERVICALGIA: ICD-10-CM

## 2025-07-10 PROCEDURE — 97112 NEUROMUSCULAR REEDUCATION: CPT

## 2025-07-10 PROCEDURE — 97110 THERAPEUTIC EXERCISES: CPT

## 2025-07-10 PROCEDURE — 97140 MANUAL THERAPY 1/> REGIONS: CPT

## 2025-07-10 NOTE — PROGRESS NOTES
Outpatient Rehab    Physical Therapy Visit    Patient Name: Chen Millan  MRN: [id number]  YOB: 1955  Encounter Date: 7/10/2025    Therapy Diagnosis:   Encounter Diagnoses   Name Primary?    Chronic right shoulder pain Yes    Cervicalgia     Weakness      Physician: Haylie Rivas DO    Physician Orders: Eval and Treat  Medical Diagnosis: Right shoulder pain  Surgical Diagnosis: Not applicable for this Episode   Surgical Date: Not applicable for this Episode  Days Since Last Surgery: Not applicable for this Episode    Visit # / Visits Authorized:  5 / 8  Insurance Authorization Period: 6/23/2025 to 7/18/2025  Date of Evaluation: 6/23/2025  Plan of Care Certification: 6/23/2025 to 7/18/2025      PT/PTA: PT   Number of PTA visits since last PT visit:0  Time In: 1358   Time Out: 1442  Total Time (in minutes): 44   Total Billable Time (in minutes): 44    FOTO:  Intake Score:  %  Survey Score 2:  %  Survey Score 3:  %    Precautions:       Subjective   Patient reporting she is having a good day today. No real pain but still some tenderness almost right beside the shoulder blade she states..  Pain reported as 0/10.      Objective      Subcranial Range of Motion   Active Restricted? Passive Restricted? Pain   Flexion         Protraction         Retraction           Cervical Range of Motion   Active (deg) Passive (deg) Pain   Flexion 57       Extension 50       Right Lateral Flexion 30       Right Rotation 60       Left Lateral Flexion 30       Left Rotation 60                      Treatment:  Therapeutic Exercise  TE 1: UBE retro x 5 min  TE 2: Corner stretch 3 x 30 s/h  TE 3: Upper trap stretch 3 x 20 s/h  TE 4: Levator scap stretch 3 x 20 s/h  TE 5: Cervical flex and extension 10 x 5 s/h each way  TE 6: Cervial rotations with [id number] x 3 s/h each way  Manual Therapy  MT 1: STM cervical musculature/uppertraps/R rhomboids  Balance/Neuromuscular Re-Education  NMR 1: seated scap retractions 3 x 20  s/h  NMR 2: brooks shoulder extension GTB 20x  NMR 3: brooks shoulder rows/retraction GTB 20x  NMR 4: horizontal abd RTB 20x  NMR 6: chin tucks 20 x 5 s  NMR 8: Brooks ER with RTB on right x 10  NMR 10: towel slides up wall 10 x 10 s/h    Time Entry(in minutes):  Manual Therapy Time Entry: 10  Neuromuscular Re-Education Time Entry: 16  Therapeutic Exercise Time Entry: 18    Assessment & Plan   Assessment: Patient with no real pain but did have tenderness medial to right shoulder blade she stated was radiating up into the neck area. She performed familiar interventions today with no increased pain, but still complaints of tenderness. She tolerated introduction of soft tissue mobilization well reporting she felt a little better after this, also showing improved cervical range of motion.   Evaluation/Treatment Tolerance: Patient tolerated treatment well    The patient will continue to benefit from skilled outpatient physical therapy in order to address the deficits listed in the problem list on the initial evaluation, provide patient and family education, and maximize the patients level of independence in the home and community environments.     The patient's spiritual, cultural, and educational needs were considered, and the patient is agreeable to the plan of care and goals.           Plan: continue with current POC.    Goals:   Active       Long term goals       pt will be independent with progressive HEP (Progressing)       Start:  06/23/25    Expected End:  07/18/25            pt will increase FOTO score to greater than or equal to 66 by DC in order to demonstrate an increase in functional ability (Progressing)       Start:  06/23/25    Expected End:  07/18/25            pt will decrease headache severity at worst from 9/10 to less than or equal to 3/10 to improve QOL (Progressing)       Start:  06/23/25    Expected End:  07/18/25            pt will decrease frequency of headache from everyday to only 3 days a week in  order to improve QOL (Met)       Start:  06/23/25    Expected End:  07/18/25    Resolved:  07/07/25         pt will increase independence with postural awareness as evidenced by requiring no cuing during PT sessions (Progressing)       Start:  06/23/25    Expected End:  07/18/25               Short term goals       pt will decrease headache severity at worst from 9/10 to less than or equal to 6/10 to improve QOL (Met)       Start:  06/23/25    Expected End:  07/04/25    Resolved:  07/10/25         pt will decrease frequency of headache from everyday to only 4 days a week in order to improve QOL (Met)       Start:  06/23/25    Expected End:  07/04/25    Resolved:  07/07/25         pt will increase independence with postural awareness as evidenced by requiring only minimal cuing during PT sessions (Progressing)       Start:  06/23/25    Expected End:  07/04/25                Mann Veloz, PT, DPT

## 2025-07-14 ENCOUNTER — CLINICAL SUPPORT (OUTPATIENT)
Dept: REHABILITATION | Facility: HOSPITAL | Age: 70
End: 2025-07-14
Payer: MEDICARE

## 2025-07-14 DIAGNOSIS — M25.511 CHRONIC RIGHT SHOULDER PAIN: Primary | ICD-10-CM

## 2025-07-14 DIAGNOSIS — M54.2 CERVICALGIA: ICD-10-CM

## 2025-07-14 DIAGNOSIS — R53.1 WEAKNESS: ICD-10-CM

## 2025-07-14 DIAGNOSIS — G89.29 CHRONIC RIGHT SHOULDER PAIN: Primary | ICD-10-CM

## 2025-07-14 PROCEDURE — 97140 MANUAL THERAPY 1/> REGIONS: CPT

## 2025-07-14 PROCEDURE — 97110 THERAPEUTIC EXERCISES: CPT

## 2025-07-14 PROCEDURE — 97112 NEUROMUSCULAR REEDUCATION: CPT

## 2025-07-14 NOTE — PROGRESS NOTES
Outpatient Rehab    Physical Therapy Visit    Patient Name: Chen Millan  MRN: [id number]  YOB: 1955  Encounter Date: 7/14/2025    Therapy Diagnosis:   Encounter Diagnoses   Name Primary?    Chronic right shoulder pain Yes    Cervicalgia     Weakness      Physician: Haylie Rivas DO    Physician Orders: Eval and Treat  Medical Diagnosis: Right shoulder pain  Surgical Diagnosis: Not applicable for this Episode   Surgical Date: Not applicable for this Episode  Days Since Last Surgery: Not applicable for this Episode    Visit # / Visits Authorized:  7 / 8  Insurance Authorization Period: 6/23/2025 to 7/18/2025  Date of Evaluation: 6/23/2025  Plan of Care Certification: 6/23/2025 to 7/18/2025      PT/PTA: PT   Number of PTA visits since last PT visit:0  Time In: 1448   Time Out: 1528  Total Time (in minutes): 40   Total Billable Time (in minutes): 40    FOTO:  Intake Score: 56%  Survey Score 2: 67%  Survey Score 3: Not applicable for this Episode%    Precautions:         Subjective   No pain or soreness reported today. Says that she was sore yesterday. Says that the manuals to the upper back felt really good last time..         Objective            Treatment:  Therapeutic Exercise  TE 1: UBE retro and forward: 2 min each  TE 2: Corner stretch 3 x 30 s/h  TE 3: Upper trap stretch 3 x 30 s/h  TE 4: Levator scap stretch 3 x 30 s/h  Manual Therapy  MT 1: STM cervical musculature/uppertraps/R rhomboids in prone  Balance/Neuromuscular Re-Education  NMR 1: prone scap retract: 20 x 3 sec hold  NMR 2: brooks shoulder extension blue TB 20x  NMR 3: brooks shoulder rows/retraction blue TB 20x  NMR 4: horizontal abd RTB 20x  NMR 8: Brooks ER with green TB: 20    Time Entry(in minutes):  Manual Therapy Time Entry: 8  Neuromuscular Re-Education Time Entry: 20  Therapeutic Exercise Time Entry: 12    Assessment & Plan   Assessment: Due to pt's reports of feeling good since last session and reports that the manuals felt  good, continued with manuals to the cervical spine and periscapular musculature with good tolerance. Progressed select banded activities in resistance with good tolerance and good form. Will continue to progress as able and as tolerated in coming visits.        The patient will continue to benefit from skilled outpatient physical therapy in order to address the deficits listed in the problem list on the initial evaluation, provide patient and family education, and maximize the patients level of independence in the home and community environments.     The patient's spiritual, cultural, and educational needs were considered, and the patient is agreeable to the plan of care and goals.           Plan: continue with current POC.    Goals:   Active       Long term goals       pt will be independent with progressive HEP (Progressing)       Start:  06/23/25    Expected End:  07/18/25            pt will increase FOTO score to greater than or equal to 66 by DC in order to demonstrate an increase in functional ability (Progressing)       Start:  06/23/25    Expected End:  07/18/25            pt will decrease headache severity at worst from 9/10 to less than or equal to 3/10 to improve QOL (Progressing)       Start:  06/23/25    Expected End:  07/18/25            pt will decrease frequency of headache from everyday to only 3 days a week in order to improve QOL (Met)       Start:  06/23/25    Expected End:  07/18/25    Resolved:  07/07/25         pt will increase independence with postural awareness as evidenced by requiring no cuing during PT sessions (Progressing)       Start:  06/23/25    Expected End:  07/18/25               Short term goals       pt will decrease headache severity at worst from 9/10 to less than or equal to 6/10 to improve QOL (Met)       Start:  06/23/25    Expected End:  07/04/25    Resolved:  07/10/25         pt will decrease frequency of headache from everyday to only 4 days a week in order to improve  QOL (Met)       Start:  06/23/25    Expected End:  07/04/25    Resolved:  07/07/25         pt will increase independence with postural awareness as evidenced by requiring only minimal cuing during PT sessions (Progressing)       Start:  06/23/25    Expected End:  07/04/25                Jacky Marie, PT, DPT  7/14/2025